# Patient Record
Sex: FEMALE | Race: ASIAN | Employment: STUDENT | URBAN - METROPOLITAN AREA
[De-identification: names, ages, dates, MRNs, and addresses within clinical notes are randomized per-mention and may not be internally consistent; named-entity substitution may affect disease eponyms.]

---
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
## 2019-03-15 ENCOUNTER — APPOINTMENT (OUTPATIENT)
Dept: LAB | Age: 19
End: 2019-03-15
Attending: FAMILY MEDICINE
Payer: COMMERCIAL

## 2019-03-15 DIAGNOSIS — D50.8 OTHER IRON DEFICIENCY ANEMIA: ICD-10-CM

## 2019-03-15 DIAGNOSIS — E04.9 GOITER: ICD-10-CM

## 2019-03-15 DIAGNOSIS — L65.9 ALOPECIA: ICD-10-CM

## 2019-03-15 LAB
DEPRECATED HBV CORE AB SER IA-ACNC: 11.2 NG/ML (ref 12–90)
IRON SATURATION: 23 % (ref 15–50)
IRON SERPL-MCNC: 93 UG/DL (ref 50–170)
T3FREE SERPL-MCNC: 2.84 PG/ML (ref 2.4–4.2)
T4 FREE SERPL-MCNC: 1 NG/DL (ref 0.9–1.6)
THYROGLOB SERPL-MCNC: 22 U/ML (ref ?–60)
THYROPEROXIDASE AB SERPL-ACNC: 33 U/ML (ref ?–60)
TOTAL IRON BINDING CAPACITY: 407 UG/DL (ref 240–450)
TRANSFERRIN SERPL-MCNC: 273 MG/DL (ref 200–360)
TSI SER-ACNC: 1.14 MIU/ML (ref 0.36–3.74)

## 2019-03-15 PROCEDURE — 86376 MICROSOMAL ANTIBODY EACH: CPT

## 2019-03-15 PROCEDURE — 36415 COLL VENOUS BLD VENIPUNCTURE: CPT

## 2019-03-15 PROCEDURE — 84481 FREE ASSAY (FT-3): CPT

## 2019-03-15 PROCEDURE — 83550 IRON BINDING TEST: CPT

## 2019-03-15 PROCEDURE — 86800 THYROGLOBULIN ANTIBODY: CPT

## 2019-03-15 PROCEDURE — 82728 ASSAY OF FERRITIN: CPT

## 2019-03-15 PROCEDURE — 84439 ASSAY OF FREE THYROXINE: CPT

## 2019-03-15 PROCEDURE — 84443 ASSAY THYROID STIM HORMONE: CPT

## 2019-03-15 PROCEDURE — 83540 ASSAY OF IRON: CPT

## 2019-05-07 ENCOUNTER — OFFICE VISIT (OUTPATIENT)
Dept: FAMILY MEDICINE CLINIC | Facility: CLINIC | Age: 19
End: 2019-05-07
Payer: COMMERCIAL

## 2019-05-07 VITALS
OXYGEN SATURATION: 99 % | DIASTOLIC BLOOD PRESSURE: 68 MMHG | HEIGHT: 64 IN | BODY MASS INDEX: 20.11 KG/M2 | TEMPERATURE: 98 F | SYSTOLIC BLOOD PRESSURE: 108 MMHG | HEART RATE: 66 BPM | WEIGHT: 117.81 LBS | RESPIRATION RATE: 16 BRPM

## 2019-05-07 DIAGNOSIS — N92.6 IRREGULAR MENSES: Primary | ICD-10-CM

## 2019-05-07 PROCEDURE — 90471 IMMUNIZATION ADMIN: CPT | Performed by: FAMILY MEDICINE

## 2019-05-07 PROCEDURE — 90651 9VHPV VACCINE 2/3 DOSE IM: CPT | Performed by: FAMILY MEDICINE

## 2019-05-07 PROCEDURE — 99213 OFFICE O/P EST LOW 20 MIN: CPT | Performed by: FAMILY MEDICINE

## 2019-05-07 NOTE — PROGRESS NOTES
HPI:   Jonny Streeter is a 22year old female who presents for irregular menses    They were very q 28 days last for 5 days - heavy and cramping better  For 3-4 months it has been very irregular   Last month menses q 10 days     Ice cravings better with the clear  EYES:PERRLA, EOMI, normal,conjunctiva pale   SKIN: norashes,no suspicious lesions  EXTREMITIES: no cyanosis, clubbing or edema  NEURO: cranial nerves are intact,motor and sensory are grossly intact    ASSESSMENT AND PLAN:   Aida Dunn is a 22 year

## 2019-06-11 ENCOUNTER — TELEPHONE (OUTPATIENT)
Dept: FAMILY MEDICINE CLINIC | Facility: CLINIC | Age: 19
End: 2019-06-11

## 2019-06-17 ENCOUNTER — NURSE ONLY (OUTPATIENT)
Dept: FAMILY MEDICINE CLINIC | Facility: CLINIC | Age: 19
End: 2019-06-17
Payer: COMMERCIAL

## 2019-06-17 PROCEDURE — 90471 IMMUNIZATION ADMIN: CPT | Performed by: FAMILY MEDICINE

## 2019-06-17 PROCEDURE — 90651 9VHPV VACCINE 2/3 DOSE IM: CPT | Performed by: FAMILY MEDICINE

## 2019-10-31 ENCOUNTER — OFFICE VISIT (OUTPATIENT)
Dept: FAMILY MEDICINE CLINIC | Facility: CLINIC | Age: 19
End: 2019-10-31
Payer: COMMERCIAL

## 2019-10-31 VITALS
WEIGHT: 118 LBS | TEMPERATURE: 97 F | DIASTOLIC BLOOD PRESSURE: 72 MMHG | OXYGEN SATURATION: 98 % | BODY MASS INDEX: 20.14 KG/M2 | SYSTOLIC BLOOD PRESSURE: 124 MMHG | HEIGHT: 64 IN | RESPIRATION RATE: 16 BRPM | HEART RATE: 73 BPM

## 2019-10-31 DIAGNOSIS — Z00.00 ANNUAL PHYSICAL EXAM: Primary | ICD-10-CM

## 2019-10-31 PROCEDURE — 99395 PREV VISIT EST AGE 18-39: CPT | Performed by: FAMILY MEDICINE

## 2019-10-31 NOTE — PROGRESS NOTES
Ag Clemons is a 20year old female who presents for a college physical.    Patient presents with complain of Patient presents with:  Physical    Pt exercising on her own  Pt denies any chest pain, SOB or syncope with exertion.   Pt denies any sexual activ chest tenderness  LUNGS: clear to auscultation, easy breathing, no cough  CARDIO: S1, S2 auscultated, RRR without murmur  GI: BSs present, no rebound/rigidity/tenderness, no organomegaly  : not examined   MUSCULOSKELETAL: CATALAN, no significant curvature of

## 2019-11-07 ENCOUNTER — TELEPHONE (OUTPATIENT)
Dept: FAMILY MEDICINE CLINIC | Facility: CLINIC | Age: 19
End: 2019-11-07

## 2019-11-07 NOTE — TELEPHONE ENCOUNTER
Fax recd from [facility] requesting us to send them most recent office note, labs and any relevant imaging to [phone]. Fax placed in LE triage folder.

## 2019-12-09 ENCOUNTER — NURSE ONLY (OUTPATIENT)
Dept: FAMILY MEDICINE CLINIC | Facility: CLINIC | Age: 19
End: 2019-12-09
Payer: COMMERCIAL

## 2019-12-09 PROCEDURE — 90651 9VHPV VACCINE 2/3 DOSE IM: CPT | Performed by: FAMILY MEDICINE

## 2019-12-09 PROCEDURE — 90471 IMMUNIZATION ADMIN: CPT | Performed by: FAMILY MEDICINE

## 2020-04-03 ENCOUNTER — VIRTUAL PHONE E/M (OUTPATIENT)
Dept: FAMILY MEDICINE CLINIC | Facility: CLINIC | Age: 20
End: 2020-04-03
Payer: COMMERCIAL

## 2020-04-03 ENCOUNTER — TELEPHONE (OUTPATIENT)
Dept: FAMILY MEDICINE CLINIC | Facility: CLINIC | Age: 20
End: 2020-04-03

## 2020-04-03 DIAGNOSIS — J11.1 INFLUENZA: Primary | ICD-10-CM

## 2020-04-03 PROCEDURE — 99212 OFFICE O/P EST SF 10 MIN: CPT | Performed by: FAMILY MEDICINE

## 2020-04-03 RX ORDER — OSELTAMIVIR PHOSPHATE 75 MG/1
75 CAPSULE ORAL 2 TIMES DAILY
Qty: 10 CAPSULE | Refills: 0 | Status: SHIPPED | OUTPATIENT
Start: 2020-04-03 | End: 2020-04-08

## 2020-06-04 ENCOUNTER — TELEPHONE (OUTPATIENT)
Dept: FAMILY MEDICINE CLINIC | Facility: CLINIC | Age: 20
End: 2020-06-04

## 2020-06-04 NOTE — TELEPHONE ENCOUNTER
Spoke with patient advised telephone visit would be appropriate, patient will send papers on my chart with titers to be reviewed, but patient denies physical needed, no official paperwork to be filled out. Will put paperwork in Dr. Yuly Rodríguez office once sent through my chart.

## 2020-06-10 ENCOUNTER — VIRTUAL PHONE E/M (OUTPATIENT)
Dept: FAMILY MEDICINE CLINIC | Facility: CLINIC | Age: 20
End: 2020-06-10
Payer: COMMERCIAL

## 2020-06-10 ENCOUNTER — TELEPHONE (OUTPATIENT)
Dept: FAMILY MEDICINE CLINIC | Facility: CLINIC | Age: 20
End: 2020-06-10

## 2020-06-10 DIAGNOSIS — Z11.59 SCREENING FOR VIRAL DISEASE: ICD-10-CM

## 2020-06-10 DIAGNOSIS — N92.6 IRREGULAR MENSES: Primary | ICD-10-CM

## 2020-06-10 PROCEDURE — 99213 OFFICE O/P EST LOW 20 MIN: CPT | Performed by: FAMILY MEDICINE

## 2020-06-10 NOTE — TELEPHONE ENCOUNTER
PT WAS HERE TODAY AND IS NOW AT [employer] TO DO HER ORDERS. THEY TELL HER THEY CAN NOT SEE ALL THE ORDERS.       FAX # [phone]

## 2020-06-10 NOTE — PROGRESS NOTES
Virtual Telephone Check-In    Andreesimone Olivarest verbally consents to a Virtual/Telephone Check-In visit on 06/10/20. Patient has been referred to the [facility] website at [email or web address] to review the yearly Consent to Treat document.     Patient understand

## 2020-06-12 ENCOUNTER — TELEPHONE (OUTPATIENT)
Dept: FAMILY MEDICINE CLINIC | Facility: CLINIC | Age: 20
End: 2020-06-12

## 2020-06-12 ENCOUNTER — PATIENT MESSAGE (OUTPATIENT)
Dept: FAMILY MEDICINE CLINIC | Facility: CLINIC | Age: 20
End: 2020-06-12

## 2020-06-15 NOTE — TELEPHONE ENCOUNTER
From: Clem oRdriguez  To: Geraldine Corbett DO  Sent: 6/12/2020 5:28 PM CDT  Subject: Other    Hello,     I got my lab tests done from [address] [place] [address] - these include by lab reports for the antibody titer tests as well as the test ordered by Dr. Nimisha Richmond (endocrin

## 2020-06-16 ENCOUNTER — NURSE ONLY (OUTPATIENT)
Dept: FAMILY MEDICINE CLINIC | Facility: CLINIC | Age: 20
End: 2020-06-16
Payer: COMMERCIAL

## 2020-06-16 PROCEDURE — 90471 IMMUNIZATION ADMIN: CPT | Performed by: FAMILY MEDICINE

## 2020-06-16 PROCEDURE — 90715 TDAP VACCINE 7 YRS/> IM: CPT | Performed by: FAMILY MEDICINE

## 2020-09-03 ENCOUNTER — OFFICE VISIT (OUTPATIENT)
Dept: INTERNAL MEDICINE CLINIC | Facility: CLINIC | Age: 20
End: 2020-09-03
Payer: COMMERCIAL

## 2020-09-03 VITALS
SYSTOLIC BLOOD PRESSURE: 102 MMHG | BODY MASS INDEX: 18.78 KG/M2 | DIASTOLIC BLOOD PRESSURE: 60 MMHG | HEART RATE: 80 BPM | TEMPERATURE: 97 F | WEIGHT: 110 LBS | RESPIRATION RATE: 16 BRPM | HEIGHT: 64 IN

## 2020-09-03 DIAGNOSIS — Z13.1 DIABETES MELLITUS SCREENING: ICD-10-CM

## 2020-09-03 DIAGNOSIS — E28.2 PCOS (POLYCYSTIC OVARIAN SYNDROME): Primary | ICD-10-CM

## 2020-09-03 DIAGNOSIS — D50.9 IRON DEFICIENCY ANEMIA, UNSPECIFIED IRON DEFICIENCY ANEMIA TYPE: ICD-10-CM

## 2020-09-03 DIAGNOSIS — F41.9 ANXIETY AND DEPRESSION: ICD-10-CM

## 2020-09-03 DIAGNOSIS — F32.A ANXIETY AND DEPRESSION: ICD-10-CM

## 2020-09-03 PROCEDURE — 3074F SYST BP LT 130 MM HG: CPT | Performed by: PHYSICIAN ASSISTANT

## 2020-09-03 PROCEDURE — 3078F DIAST BP <80 MM HG: CPT | Performed by: PHYSICIAN ASSISTANT

## 2020-09-03 PROCEDURE — 3008F BODY MASS INDEX DOCD: CPT | Performed by: PHYSICIAN ASSISTANT

## 2020-09-03 PROCEDURE — 99203 OFFICE O/P NEW LOW 30 MIN: CPT | Performed by: PHYSICIAN ASSISTANT

## 2020-09-03 RX ORDER — HYDROXYZINE HYDROCHLORIDE 25 MG/1
25 TABLET, FILM COATED ORAL EVERY 8 HOURS PRN
Qty: 8 TABLET | Refills: 0 | Status: SHIPPED | OUTPATIENT
Start: 2020-09-03 | End: 2021-07-16

## 2020-09-03 RX ORDER — METFORMIN HYDROCHLORIDE 500 MG/1
500 TABLET, EXTENDED RELEASE ORAL
COMMUNITY
Start: 2020-07-29 | End: 2021-07-16

## 2020-09-03 NOTE — PROGRESS NOTES
Patient presents with:  Weight Loss: DD Rm 6, Loss of appetite  Anxiety: Hydroxyzine 0.25MG for 2 days, began 7/2020 loss of friend, Care of sister, Stressors include Medical School      HPI:  Pt presents as a new patient to establish care with several iss Negative for color change and rash. Neurological: Negative for dizziness, syncope, weakness, numbness, tingling and headaches. Hematological: Negative for adenopathy. Does not bruise/bleed easily.    Psychiatric/Behavioral: The patient is not nervous/an range of motion. No edema and no tenderness. No effusions. Lymphadenopathy: No cervical adenopathy. Neurological: Normal reflexes. No cranial nerve deficit or sensory deficit. Normal muscle tone. Coordination normal.   Skin: Skin is warm and dry.  No ra

## 2020-09-08 ENCOUNTER — PATIENT MESSAGE (OUTPATIENT)
Dept: INTERNAL MEDICINE CLINIC | Facility: CLINIC | Age: 20
End: 2020-09-08

## 2020-09-09 LAB
% SATURATION: 32 % (CALC) (ref 16–45)
ABSOLUTE BASOPHILS: 22 CELLS/UL (ref 0–200)
ABSOLUTE EOSINOPHILS: 120 CELLS/UL (ref 15–500)
ABSOLUTE LYMPHOCYTES: 1509 CELLS/UL (ref 850–3900)
ABSOLUTE MONOCYTES: 331 CELLS/UL (ref 200–950)
ABSOLUTE NEUTROPHILS: 2318 CELLS/UL (ref 1500–7800)
ALBUMIN/GLOBULIN RATIO: 1.7 (CALC) (ref 1–2.5)
ALBUMIN: 4.7 G/DL (ref 3.6–5.1)
ALKALINE PHOSPHATASE: 56 U/L (ref 31–125)
ALT: 12 U/L (ref 6–29)
AST: 13 U/L (ref 10–30)
BASOPHILS: 0.5 %
BILIRUBIN, TOTAL: 0.6 MG/DL (ref 0.2–1.2)
BUN: 16 MG/DL (ref 7–25)
CALCIUM: 10 MG/DL (ref 8.6–10.2)
CARBON DIOXIDE: 23 MMOL/L (ref 20–32)
CHLORIDE: 105 MMOL/L (ref 98–110)
CREATININE: 0.62 MG/DL (ref 0.5–1.1)
DHEA SULFATE: 522 MCG/DL (ref 51–321)
EGFR IF AFRICN AM: 149 ML/MIN/1.73M2
EGFR IF NONAFRICN AM: 129 ML/MIN/1.73M2
EOSINOPHILS: 2.8 %
FERRITIN: 21 NG/ML (ref 16–154)
GLOBULIN: 2.7 G/DL (CALC) (ref 1.9–3.7)
GLUCOSE: 95 MG/DL (ref 65–99)
HEMATOCRIT: 36.3 % (ref 35–45)
HEMOGLOBIN: 12.5 G/DL (ref 11.7–15.5)
INSULIN: 4.3 UIU/ML
IRON BINDING CAPACITY: 345 MCG/DL (CALC) (ref 250–450)
IRON, TOTAL: 112 MCG/DL (ref 40–190)
LYMPHOCYTES: 35.1 %
MCH: 30.7 PG (ref 27–33)
MCHC: 34.4 G/DL (ref 32–36)
MCV: 89.2 FL (ref 80–100)
MONOCYTES: 7.7 %
MPV: 10.9 FL (ref 7.5–12.5)
NEUTROPHILS: 53.9 %
PLATELET COUNT: 206 THOUSAND/UL (ref 140–400)
POTASSIUM: 4 MMOL/L (ref 3.5–5.3)
PROTEIN, TOTAL: 7.4 G/DL (ref 6.1–8.1)
RDW: 12.5 % (ref 11–15)
RED BLOOD CELL COUNT: 4.07 MILLION/UL (ref 3.8–5.1)
SODIUM: 137 MMOL/L (ref 135–146)
TSH W/REFLEX TO FT4: 1.93 MIU/L
WHITE BLOOD CELL COUNT: 4.3 THOUSAND/UL (ref 3.8–10.8)

## 2020-09-09 NOTE — TELEPHONE ENCOUNTER
I sent Sertraline 50 mg PO daily to [facility] on [address] per her [employer] message. Please call pt to verify I sent to correct pharmacy. Please also verify no SI/HI.   Please ask her to call us if any SEs or go to ER if any SI/HI develops after

## 2020-09-09 NOTE — TELEPHONE ENCOUNTER
From: June Batres  To: Maria Del Carmen Mata PA-C  Sent: 9/8/2020 9:14 PM CDT  Subject: Prescription Question    Lily Cameron David Choudhary    I am getting inclined towards medicine, can you please send the prescription to [facility] at [facility]? I have schedule the appoint

## 2020-09-09 NOTE — TELEPHONE ENCOUNTER
LOV 9/3/20 with CB. Copied notes below:    A/P:     Pcos (polycystic ovarian syndrome)  (primary encounter diagnosis) - Check labs and f/u in 4 weeks. Pt has already seen Gyn and Endo.   Iron deficiency anemia, unspecified iron deficiency anemia type - Ch

## 2020-09-11 NOTE — PROGRESS NOTES
Ferritin and iron studies are normal indicating no iron def. CMP is normal.  CBC is normal.  DHEA is higher than last I compare with. Fasting insulin level is normal.  Thyroid function is normal.  I can discuss further with pt at f/u visit.

## 2020-09-16 RX ORDER — SERTRALINE HYDROCHLORIDE 25 MG/1
25 TABLET, FILM COATED ORAL DAILY
Qty: 30 TABLET | Refills: 0 | Status: SHIPPED | OUTPATIENT
Start: 2020-09-16 | End: 2020-11-12

## 2020-09-16 NOTE — TELEPHONE ENCOUNTER
CB please advise if ok to send 25 mg to pharmacy as requested by patient. Have patient cut 50 mg in half? Please advise.

## 2020-11-04 ENCOUNTER — TELEPHONE (OUTPATIENT)
Dept: INTERNAL MEDICINE CLINIC | Facility: CLINIC | Age: 20
End: 2020-11-04

## 2020-11-04 DIAGNOSIS — Z13.228 SCREENING FOR METABOLIC DISORDER: ICD-10-CM

## 2020-11-04 DIAGNOSIS — Z13.0 SCREENING FOR BLOOD DISEASE: ICD-10-CM

## 2020-11-04 DIAGNOSIS — Z13.29 SCREENING FOR THYROID DISORDER: ICD-10-CM

## 2020-11-04 DIAGNOSIS — Z13.220 SCREENING FOR LIPID DISORDERS: ICD-10-CM

## 2020-11-04 DIAGNOSIS — Z00.00 ROUTINE GENERAL MEDICAL EXAMINATION AT A HEALTH CARE FACILITY: Primary | ICD-10-CM

## 2020-11-04 NOTE — TELEPHONE ENCOUNTER
Future Appointments   Date Time Provider Porsche Noel   11/25/2020 10:30 AM Park Diamond PA-C [facility]     Annual Physical   Lab is [facility]  Pt aware to fast and to complete labs no sooner than 2 weeks prior to physical   No call back r

## 2020-11-12 ENCOUNTER — OFFICE VISIT (OUTPATIENT)
Dept: INTERNAL MEDICINE CLINIC | Facility: CLINIC | Age: 20
End: 2020-11-12
Payer: COMMERCIAL

## 2020-11-12 VITALS
HEART RATE: 78 BPM | SYSTOLIC BLOOD PRESSURE: 96 MMHG | TEMPERATURE: 98 F | DIASTOLIC BLOOD PRESSURE: 60 MMHG | BODY MASS INDEX: 19.81 KG/M2 | WEIGHT: 116 LBS | HEIGHT: 64 IN | RESPIRATION RATE: 16 BRPM

## 2020-11-12 DIAGNOSIS — F32.A ANXIETY AND DEPRESSION: ICD-10-CM

## 2020-11-12 DIAGNOSIS — Z00.00 ANNUAL PHYSICAL EXAM: Primary | ICD-10-CM

## 2020-11-12 DIAGNOSIS — F41.9 ANXIETY AND DEPRESSION: ICD-10-CM

## 2020-11-12 DIAGNOSIS — H61.21 IMPACTED CERUMEN OF RIGHT EAR: ICD-10-CM

## 2020-11-12 DIAGNOSIS — E28.2 PCOS (POLYCYSTIC OVARIAN SYNDROME): ICD-10-CM

## 2020-11-12 PROCEDURE — 3008F BODY MASS INDEX DOCD: CPT | Performed by: PHYSICIAN ASSISTANT

## 2020-11-12 PROCEDURE — 99395 PREV VISIT EST AGE 18-39: CPT | Performed by: PHYSICIAN ASSISTANT

## 2020-11-12 PROCEDURE — 3074F SYST BP LT 130 MM HG: CPT | Performed by: PHYSICIAN ASSISTANT

## 2020-11-12 PROCEDURE — 3078F DIAST BP <80 MM HG: CPT | Performed by: PHYSICIAN ASSISTANT

## 2020-11-12 NOTE — PROGRESS NOTES
Patient presents with:  Physical: DD Rm 6, Annual Physical  Medication Follow-Up: Sertraline dosing      HPI:  Pt presents for annual physical and f/u anxiety. Pt was reluctant to start Sertraline and only started a couple of weeks ago.   Admits that h Procedure Laterality Date   • APPENDECTOMY  2010     Family History   Problem Relation Age of Onset   • Hypertension Mother    • Other (hypothyroidism) Mother    • Other (Diabetes (DM II)) Maternal Grandmother      Social History    Tobacco Use      Smok Normal rate, regular rhythm and intact distal pulses. No murmur, rubs or gallops. Pulmonary/Chest: Effort normal and breath sounds normal. No respiratory distress. No wheezes, rhonchi or rales  Abdominal: Soft.  Bowel sounds are normal. Non tender, no ma Prescriptions Disp Refills   • Sertraline HCl 50 MG Oral Tab 30 tablet 0     Sig: Take 1 tablet (50 mg total) by mouth daily. Imaging & Consults:  None      Return in about 4 weeks (around 12/10/2020) for Anxiety and depression.     There are no Patie

## 2020-11-13 PROBLEM — D50.9 IRON DEFICIENCY ANEMIA: Status: RESOLVED | Noted: 2020-09-03 | Resolved: 2020-11-13

## 2020-11-20 RX ORDER — SERTRALINE HYDROCHLORIDE 25 MG/1
TABLET, FILM COATED ORAL
Qty: 30 TABLET | Refills: 0 | Status: SHIPPED | OUTPATIENT
Start: 2020-11-20 | End: 2020-12-14

## 2020-11-20 NOTE — TELEPHONE ENCOUNTER
LFV:11/12/2020 with CB  Anxiety and depression - Anxiety improving. Discussed options including STEM clinic. Pt would like to increase Sertraline to 50 mg daily.   Discussed that if making her feel more \"flat\" then we will consider alt med or refer to S

## 2020-11-20 NOTE — TELEPHONE ENCOUNTER
Please let pt know I am OK with her continuing Sertraline 25 mg daily. I would still like her to f/u in about 4 weeks for re-eval, OK to virtual if she prefers.

## 2020-12-14 ENCOUNTER — PATIENT MESSAGE (OUTPATIENT)
Dept: INTERNAL MEDICINE CLINIC | Facility: CLINIC | Age: 20
End: 2020-12-14

## 2020-12-15 NOTE — TELEPHONE ENCOUNTER
LM on pt preferred number requesting pt to call if she is having SI/HI.   Pt also advised to F/U in May/June of 2021

## 2020-12-15 NOTE — TELEPHONE ENCOUNTER
From: Roxy Ferguson  To: Shiela Boyce PA-C  Sent: 12/14/2020 3:01 PM CST  Subject: Prescription Question    Lily Sinha    I have been taking Sertraline 50mg since 22nd Nov and I see a considerable difference in my mood - all poisitive.  I am feeling

## 2020-12-15 NOTE — TELEPHONE ENCOUNTER
Just confirm with pt no SI/HI so that it is documented. I sent the refill. I will need to see her in May or June for follow up of anxiety and depression.

## 2021-04-29 NOTE — PROGRESS NOTES
Patient presents with: Follow - Up: DD Rm 6, Anxiety medication F/U   Blood Sugar: Fasting reading 108      HPI:  Pt presents for follow up of anxiety and depression.   Pt has tolerated Sertraline 50 mg daily over the last almost 6 mos with good control of problems. She will begin taper in a couple of weeks. Pcos (polycystic ovarian syndrome) - Check DHEA with labs. Plans to follow up with Gyn this summer. Hyperglycemia - In setting of PCOS. Check A1c.       Orders Placed This Encounter      HGB A1C [495

## 2021-05-03 NOTE — PROGRESS NOTES
A1c falls in normal range. DHEA is elevated, more than last check. Pt is planning to follow up with her Gyn.

## 2021-05-10 NOTE — TELEPHONE ENCOUNTER
Last VISIT 4/29/21 CB Anxiety/Depression    Last REFILL 12/15/20 Sertraline 30T 4R    Last LABS 5/1/21 Dehydroepiandrosterone, HgA1c 9/8/20 TSH, CBC. CMP, Iron TIBC, Ferritin    No future appointments. Per PROTOCOL?   Sertraline not on protocol, Route

## 2021-05-10 NOTE — TELEPHONE ENCOUNTER
I declined this refill as pt told me as last visit she would like to wean off Sertraline.   Please let her know that if something has changed I will refill but I thought this was likely an \"auto refill\" request.

## 2021-05-11 NOTE — TELEPHONE ENCOUNTER
Spoke with pt,  States she has not started to ween off of Rx. Pt states that she wants 2 months of Rx and will try to ween off when school ends and is on summer break. Pending to preferred pharmacy, please sign if appropriate.

## 2022-05-11 NOTE — PROGRESS NOTES
Virtual/Telephone Check-In    Ag Clemons verbally consents to a Virtual/Telephone Check-In service on 04/03/20. Patient understands and accepts financial responsibility for any deductible, co-insurance and/or co-pays associated with this service.     Dur surgery for wound right back

## 2022-09-12 ENCOUNTER — TELEPHONE (OUTPATIENT)
Dept: INTERNAL MEDICINE CLINIC | Facility: CLINIC | Age: 22
End: 2022-09-12

## 2022-09-12 DIAGNOSIS — Z13.228 SCREENING FOR METABOLIC DISORDER: ICD-10-CM

## 2022-09-12 DIAGNOSIS — Z13.29 SCREENING FOR THYROID DISORDER: ICD-10-CM

## 2022-09-12 DIAGNOSIS — Z13.0 SCREENING FOR BLOOD DISEASE: ICD-10-CM

## 2022-09-12 DIAGNOSIS — Z13.220 SCREENING FOR LIPID DISORDERS: ICD-10-CM

## 2022-09-12 DIAGNOSIS — Z00.00 ROUTINE GENERAL MEDICAL EXAMINATION AT A HEALTH CARE FACILITY: Primary | ICD-10-CM

## 2022-09-12 NOTE — TELEPHONE ENCOUNTER
Future Appointments   Date Time Provider Porsche Noel   10/22/2022 11:40 AM Roger Clifford MD [facility]     [facility] to [facility]-  Pt informed that labs need to be completed no sooner than 2 weeks prior to the appt.  Pt aware to fast-no call back required

## 2022-10-13 NOTE — TELEPHONE ENCOUNTER
Pt changed appt to   Future Appointments   Date Time Provider Porsche Sadie   10/20/2022  2:00 PM Ruiz Weldon PA-C [facility]     [facility] to [facility] Pt informed that labs need to be completed no sooner than 2 weeks prior to the appt.  Pt aware to fast-no call back required

## 2022-10-20 ENCOUNTER — OFFICE VISIT (OUTPATIENT)
Dept: INTERNAL MEDICINE CLINIC | Facility: CLINIC | Age: 22
End: 2022-10-20
Payer: COMMERCIAL

## 2022-10-20 ENCOUNTER — TELEPHONE (OUTPATIENT)
Dept: INTERNAL MEDICINE CLINIC | Facility: CLINIC | Age: 22
End: 2022-10-20

## 2022-10-20 VITALS
RESPIRATION RATE: 12 BRPM | SYSTOLIC BLOOD PRESSURE: 110 MMHG | OXYGEN SATURATION: 100 % | HEART RATE: 68 BPM | BODY MASS INDEX: 19.77 KG/M2 | HEIGHT: 64 IN | DIASTOLIC BLOOD PRESSURE: 62 MMHG | WEIGHT: 115.81 LBS

## 2022-10-20 DIAGNOSIS — R73.9 HYPERGLYCEMIA: ICD-10-CM

## 2022-10-20 DIAGNOSIS — Z20.2 EXPOSURE TO SYPHILIS: ICD-10-CM

## 2022-10-20 DIAGNOSIS — E28.2 PCOS (POLYCYSTIC OVARIAN SYNDROME): ICD-10-CM

## 2022-10-20 DIAGNOSIS — K62.5 BRBPR (BRIGHT RED BLOOD PER RECTUM): ICD-10-CM

## 2022-10-20 DIAGNOSIS — Z00.00 ANNUAL PHYSICAL EXAM: Primary | ICD-10-CM

## 2022-10-20 PROCEDURE — 99395 PREV VISIT EST AGE 18-39: CPT | Performed by: PHYSICIAN ASSISTANT

## 2022-10-20 PROCEDURE — 3074F SYST BP LT 130 MM HG: CPT | Performed by: PHYSICIAN ASSISTANT

## 2022-10-20 PROCEDURE — 90686 IIV4 VACC NO PRSV 0.5 ML IM: CPT | Performed by: PHYSICIAN ASSISTANT

## 2022-10-20 PROCEDURE — 3078F DIAST BP <80 MM HG: CPT | Performed by: PHYSICIAN ASSISTANT

## 2022-10-20 PROCEDURE — 3008F BODY MASS INDEX DOCD: CPT | Performed by: PHYSICIAN ASSISTANT

## 2022-10-20 PROCEDURE — 90471 IMMUNIZATION ADMIN: CPT | Performed by: PHYSICIAN ASSISTANT

## 2022-10-22 LAB
ABSOLUTE BASOPHILS: 48 CELLS/UL (ref 0–200)
ABSOLUTE EOSINOPHILS: 212 CELLS/UL (ref 15–500)
ABSOLUTE LYMPHOCYTES: 1166 CELLS/UL (ref 850–3900)
ABSOLUTE MONOCYTES: 424 CELLS/UL (ref 200–950)
ABSOLUTE NEUTROPHILS: 3450 CELLS/UL (ref 1500–7800)
ALBUMIN/GLOBULIN RATIO: 1.7 (CALC) (ref 1–2.5)
ALBUMIN: 4.8 G/DL (ref 3.6–5.1)
ALKALINE PHOSPHATASE: 60 U/L (ref 31–125)
ALT: 16 U/L (ref 6–29)
AST: 17 U/L (ref 10–30)
BASOPHILS: 0.9 %
BILIRUBIN, TOTAL: 0.4 MG/DL (ref 0.2–1.2)
BUN: 20 MG/DL (ref 7–25)
CALCIUM: 10 MG/DL (ref 8.6–10.2)
CARBON DIOXIDE: 24 MMOL/L (ref 20–32)
CHLORIDE: 104 MMOL/L (ref 98–110)
CHOL/HDLC RATIO: 2.3 (CALC)
CHOLESTEROL, TOTAL: 142 MG/DL
CREATININE: 0.65 MG/DL (ref 0.5–0.96)
EGFR: 127 ML/MIN/1.73M2
EOSINOPHILS: 4 %
GLOBULIN: 2.8 G/DL (CALC) (ref 1.9–3.7)
GLUCOSE: 89 MG/DL (ref 65–99)
HDL CHOLESTEROL: 62 MG/DL
HEMATOCRIT: 37.7 % (ref 35–45)
HEMOGLOBIN: 12.5 G/DL (ref 11.7–15.5)
LDL-CHOLESTEROL: 66 MG/DL (CALC)
LYMPHOCYTES: 22 %
MCH: 30.6 PG (ref 27–33)
MCHC: 33.2 G/DL (ref 32–36)
MCV: 92.2 FL (ref 80–100)
MONOCYTES: 8 %
MPV: 10.9 FL (ref 7.5–12.5)
NEUTROPHILS: 65.1 %
NON-HDL CHOLESTEROL: 80 MG/DL (CALC)
PLATELET COUNT: 200 THOUSAND/UL (ref 140–400)
POTASSIUM: 4.2 MMOL/L (ref 3.5–5.3)
PROTEIN, TOTAL: 7.6 G/DL (ref 6.1–8.1)
RDW: 12.2 % (ref 11–15)
RED BLOOD CELL COUNT: 4.09 MILLION/UL (ref 3.8–5.1)
SODIUM: 137 MMOL/L (ref 135–146)
TRIGLYCERIDES: 56 MG/DL
TSH W/REFLEX TO FT4: 2.23 MIU/L
WHITE BLOOD CELL COUNT: 5.3 THOUSAND/UL (ref 3.8–10.8)

## 2023-03-29 ENCOUNTER — HOSPITAL ENCOUNTER (OUTPATIENT)
Dept: GENERAL RADIOLOGY | Age: 23
Discharge: HOME | End: 2023-03-29
Attending: PHYSICIAN ASSISTANT
Payer: COMMERCIAL

## 2023-03-29 ENCOUNTER — OFFICE VISIT (OUTPATIENT)
Dept: INTERNAL MEDICINE CLINIC | Facility: CLINIC | Age: 23
End: 2023-03-29
Payer: COMMERCIAL

## 2023-03-29 ENCOUNTER — LAB ENCOUNTER (OUTPATIENT)
Dept: LAB | Age: 23
End: 2023-03-29
Attending: PHYSICIAN ASSISTANT
Payer: COMMERCIAL

## 2023-03-29 VITALS
WEIGHT: 112.19 LBS | OXYGEN SATURATION: 99 % | DIASTOLIC BLOOD PRESSURE: 58 MMHG | SYSTOLIC BLOOD PRESSURE: 106 MMHG | TEMPERATURE: 97 F | RESPIRATION RATE: 16 BRPM | HEIGHT: 64.17 IN | BODY MASS INDEX: 19.15 KG/M2 | HEART RATE: 84 BPM

## 2023-03-29 DIAGNOSIS — R73.9 HYPERGLYCEMIA: Primary | ICD-10-CM

## 2023-03-29 DIAGNOSIS — Z78.9 HISTORY OF RECENT FOREIGN TRAVEL: ICD-10-CM

## 2023-03-29 DIAGNOSIS — R63.4 WEIGHT LOSS: ICD-10-CM

## 2023-03-29 DIAGNOSIS — R19.5 CHANGE IN STOOL: ICD-10-CM

## 2023-03-29 DIAGNOSIS — K59.09 CHRONIC CONSTIPATION: ICD-10-CM

## 2023-03-29 DIAGNOSIS — R19.7 DIARRHEA, UNSPECIFIED TYPE: ICD-10-CM

## 2023-03-29 DIAGNOSIS — Z20.2 EXPOSURE TO SYPHILIS: ICD-10-CM

## 2023-03-29 DIAGNOSIS — R19.7 DIARRHEA, UNSPECIFIED TYPE: Primary | ICD-10-CM

## 2023-03-29 LAB
ALBUMIN SERPL-MCNC: 4 G/DL (ref 3.4–5)
ALBUMIN/GLOB SERPL: 1 {RATIO} (ref 1–2)
ALP LIVER SERPL-CCNC: 65 U/L
ALT SERPL-CCNC: 54 U/L
ANION GAP SERPL CALC-SCNC: 3 MMOL/L (ref 0–18)
AST SERPL-CCNC: 21 U/L (ref 15–37)
BASOPHILS # BLD AUTO: 0.02 X10(3) UL (ref 0–0.2)
BASOPHILS NFR BLD AUTO: 0.4 %
BILIRUB SERPL-MCNC: 0.3 MG/DL (ref 0.1–2)
BUN BLD-MCNC: 19 MG/DL (ref 7–18)
CALCIUM BLD-MCNC: 9.6 MG/DL (ref 8.5–10.1)
CHLORIDE SERPL-SCNC: 109 MMOL/L (ref 98–112)
CO2 SERPL-SCNC: 27 MMOL/L (ref 21–32)
CREAT BLD-MCNC: 0.49 MG/DL
EOSINOPHIL # BLD AUTO: 0.06 X10(3) UL (ref 0–0.7)
EOSINOPHIL NFR BLD AUTO: 1.1 %
ERYTHROCYTE [DISTWIDTH] IN BLOOD BY AUTOMATED COUNT: 12.1 %
FASTING STATUS PATIENT QL REPORTED: NO
GFR SERPLBLD BASED ON 1.73 SQ M-ARVRAT: 136 ML/MIN/1.73M2 (ref 60–?)
GLOBULIN PLAS-MCNC: 4 G/DL (ref 2.8–4.4)
GLUCOSE BLD-MCNC: 97 MG/DL (ref 70–99)
HCT VFR BLD AUTO: 36.1 %
HGB BLD-MCNC: 12.1 G/DL
IMM GRANULOCYTES # BLD AUTO: 0.01 X10(3) UL (ref 0–1)
IMM GRANULOCYTES NFR BLD: 0.2 %
LYMPHOCYTES # BLD AUTO: 1.98 X10(3) UL (ref 1–4)
LYMPHOCYTES NFR BLD AUTO: 36.2 %
MCH RBC QN AUTO: 28.9 PG (ref 26–34)
MCHC RBC AUTO-ENTMCNC: 33.5 G/DL (ref 31–37)
MCV RBC AUTO: 86.2 FL
MONOCYTES # BLD AUTO: 0.51 X10(3) UL (ref 0.1–1)
MONOCYTES NFR BLD AUTO: 9.3 %
NEUTROPHILS # BLD AUTO: 2.89 X10 (3) UL (ref 1.5–7.7)
NEUTROPHILS # BLD AUTO: 2.89 X10(3) UL (ref 1.5–7.7)
NEUTROPHILS NFR BLD AUTO: 52.8 %
OSMOLALITY SERPL CALC.SUM OF ELEC: 290 MOSM/KG (ref 275–295)
PLATELET # BLD AUTO: 275 10(3)UL (ref 150–450)
POTASSIUM SERPL-SCNC: 4.4 MMOL/L (ref 3.5–5.1)
PROT SERPL-MCNC: 8 G/DL (ref 6.4–8.2)
RBC # BLD AUTO: 4.19 X10(6)UL
SODIUM SERPL-SCNC: 139 MMOL/L (ref 136–145)
WBC # BLD AUTO: 5.5 X10(3) UL (ref 4–11)

## 2023-03-29 PROCEDURE — 80053 COMPREHEN METABOLIC PANEL: CPT | Performed by: PHYSICIAN ASSISTANT

## 2023-03-29 PROCEDURE — 99214 OFFICE O/P EST MOD 30 MIN: CPT | Performed by: PHYSICIAN ASSISTANT

## 2023-03-29 PROCEDURE — 36415 COLL VENOUS BLD VENIPUNCTURE: CPT | Performed by: PHYSICIAN ASSISTANT

## 2023-03-29 PROCEDURE — 74018 RADEX ABDOMEN 1 VIEW: CPT | Performed by: PHYSICIAN ASSISTANT

## 2023-03-29 PROCEDURE — 3078F DIAST BP <80 MM HG: CPT | Performed by: PHYSICIAN ASSISTANT

## 2023-03-29 PROCEDURE — 3074F SYST BP LT 130 MM HG: CPT | Performed by: PHYSICIAN ASSISTANT

## 2023-03-29 PROCEDURE — 3008F BODY MASS INDEX DOCD: CPT | Performed by: PHYSICIAN ASSISTANT

## 2023-03-29 PROCEDURE — 85025 COMPLETE CBC W/AUTO DIFF WBC: CPT | Performed by: PHYSICIAN ASSISTANT

## 2023-12-08 ENCOUNTER — TELEPHONE (OUTPATIENT)
Dept: INTERNAL MEDICINE CLINIC | Facility: CLINIC | Age: 23
End: 2023-12-08

## 2023-12-08 NOTE — TELEPHONE ENCOUNTER
CB: since patient under 25, any labs needed before appt?     Your Appointments      Wednesday December 27, 2023 10:00 AM  Physical - Established with Akbar Donald PA-C  [facility], [place] ([facility] [place]) [address] [facility]  [phone]

## 2023-12-20 LAB
ABSOLUTE BASOPHILS: 40 CELLS/UL (ref 0–200)
ABSOLUTE EOSINOPHILS: 220 CELLS/UL (ref 15–500)
ABSOLUTE LYMPHOCYTES: 1712 CELLS/UL (ref 850–3900)
ABSOLUTE MONOCYTES: 334 CELLS/UL (ref 200–950)
ABSOLUTE NEUTROPHILS: 2094 CELLS/UL (ref 1500–7800)
ALBUMIN/GLOBULIN RATIO: 1.8 (CALC) (ref 1–2.5)
ALBUMIN: 4.8 G/DL (ref 3.6–5.1)
ALKALINE PHOSPHATASE: 56 U/L (ref 31–125)
ALT: 16 U/L (ref 6–29)
AST: 16 U/L (ref 10–30)
BASOPHILS: 0.9 %
BILIRUBIN, TOTAL: 0.5 MG/DL (ref 0.2–1.2)
BUN: 16 MG/DL (ref 7–25)
CALCIUM: 10 MG/DL (ref 8.6–10.2)
CARBON DIOXIDE: 27 MMOL/L (ref 20–32)
CHLORIDE: 104 MMOL/L (ref 98–110)
CREATININE: 0.64 MG/DL (ref 0.5–0.96)
EGFR: 126 ML/MIN/1.73M2
EOSINOPHILS: 5 %
GLOBULIN: 2.7 G/DL (CALC) (ref 1.9–3.7)
GLUCOSE: 96 MG/DL (ref 65–99)
HEMATOCRIT: 37.9 % (ref 35–45)
HEMOGLOBIN A1C: 5.2 % OF TOTAL HGB
HEMOGLOBIN: 12.5 G/DL (ref 11.7–15.5)
LYMPHOCYTES: 38.9 %
MCH: 30.5 PG (ref 27–33)
MCHC: 33 G/DL (ref 32–36)
MCV: 92.4 FL (ref 80–100)
MONOCYTES: 7.6 %
MPV: 11.2 FL (ref 7.5–12.5)
NEUTROPHILS: 47.6 %
PLATELET COUNT: 225 THOUSAND/UL (ref 140–400)
POTASSIUM: 4.3 MMOL/L (ref 3.5–5.3)
PROTEIN, TOTAL: 7.5 G/DL (ref 6.1–8.1)
RDW: 11.9 % (ref 11–15)
RED BLOOD CELL COUNT: 4.1 MILLION/UL (ref 3.8–5.1)
SODIUM: 139 MMOL/L (ref 135–146)
WHITE BLOOD CELL COUNT: 4.4 THOUSAND/UL (ref 3.8–10.8)

## 2023-12-27 ENCOUNTER — OFFICE VISIT (OUTPATIENT)
Dept: INTERNAL MEDICINE CLINIC | Facility: CLINIC | Age: 23
End: 2023-12-27
Payer: COMMERCIAL

## 2023-12-27 VITALS
RESPIRATION RATE: 12 BRPM | SYSTOLIC BLOOD PRESSURE: 110 MMHG | HEART RATE: 67 BPM | OXYGEN SATURATION: 98 % | HEIGHT: 64 IN | WEIGHT: 124.63 LBS | BODY MASS INDEX: 21.28 KG/M2 | DIASTOLIC BLOOD PRESSURE: 64 MMHG

## 2023-12-27 DIAGNOSIS — M54.9 CHRONIC BILATERAL BACK PAIN, UNSPECIFIED BACK LOCATION: ICD-10-CM

## 2023-12-27 DIAGNOSIS — G89.29 CHRONIC BILATERAL BACK PAIN, UNSPECIFIED BACK LOCATION: ICD-10-CM

## 2023-12-27 DIAGNOSIS — E28.2 PCOS (POLYCYSTIC OVARIAN SYNDROME): ICD-10-CM

## 2023-12-27 DIAGNOSIS — Z00.00 ANNUAL PHYSICAL EXAM: Primary | ICD-10-CM

## 2023-12-27 PROBLEM — F32.A ANXIETY AND DEPRESSION: Status: RESOLVED | Noted: 2020-09-03 | Resolved: 2023-12-27

## 2023-12-27 PROBLEM — F41.9 ANXIETY AND DEPRESSION: Status: RESOLVED | Noted: 2020-09-03 | Resolved: 2023-12-27

## 2023-12-27 PROCEDURE — 3074F SYST BP LT 130 MM HG: CPT | Performed by: PHYSICIAN ASSISTANT

## 2023-12-27 PROCEDURE — 3078F DIAST BP <80 MM HG: CPT | Performed by: PHYSICIAN ASSISTANT

## 2023-12-27 PROCEDURE — 99395 PREV VISIT EST AGE 18-39: CPT | Performed by: PHYSICIAN ASSISTANT

## 2023-12-27 PROCEDURE — 3008F BODY MASS INDEX DOCD: CPT | Performed by: PHYSICIAN ASSISTANT

## 2024-08-08 ENCOUNTER — TELEPHONE (OUTPATIENT)
Dept: INTERNAL MEDICINE CLINIC | Facility: CLINIC | Age: 24
End: 2024-08-08

## 2024-08-08 DIAGNOSIS — M54.9 CHRONIC BILATERAL BACK PAIN, UNSPECIFIED BACK LOCATION: Primary | ICD-10-CM

## 2024-08-08 DIAGNOSIS — G89.29 CHRONIC BILATERAL BACK PAIN, UNSPECIFIED BACK LOCATION: Primary | ICD-10-CM

## 2024-08-08 NOTE — TELEPHONE ENCOUNTER
Called Tristian at [facility] to confirm fax, fax [phone].     External order pended.

## 2024-08-08 NOTE — TELEPHONE ENCOUNTER
Tristian/ with [facility] is calling to request an external referral order to their facility.   Patient currently has an order for [facility], patient would like to go to [facility]. Please fax order to number below    . [phone]  Fax. [phone]

## 2024-08-14 ENCOUNTER — MED REC SCAN ONLY (OUTPATIENT)
Dept: INTERNAL MEDICINE CLINIC | Facility: CLINIC | Age: 24
End: 2024-08-14

## 2025-01-01 NOTE — PROGRESS NOTES
Chief Complaint   Patient presents with    Physical     Rm 6 SS  Reviewed Preventative/Wellness form with patient.         HPI:  Pt presents for annual physical.  Has not had DHEA tested in over a year (last level was much improved over last).  Last A1c was normal.    Normal lipid panel in 2022.    Pt is not sexually active.  Has been vaccinated for HPV as well.  Declines pap smear today, has never had one.    Pt has a mild cough that started yesterday.  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  No fever.  Overall doing well and will follow up at her hospital for testing if needed.      Eating and exercise have been difficult during intern year.  Looking forward to a time she can pay more attention to these things.       Review of Systems   Constitutional: Negative for fever, chills and fatigue. No distress.  HENT: Negative for hearing loss, congestion, sore throat, neck pain and dental problem.    Eyes: Negative for pain and visual disturbance.   Respiratory: Negative for cough, chest tightness, shortness of breath and wheezing.    Cardiovascular: Negative for chest pain, palpitations and leg swelling.   Gastrointestinal: Negative for nausea, vomiting, abdominal pain, diarrhea, blood in stool  Genitourinary: Negative for dysuria, hematuria and difficulty urinating.   Musculoskeletal: Negative for myalgias, back pain, joint swelling, arthralgias and gait problem.   Skin: Negative for color change and rash.   Neurological: Negative for dizziness, syncope, weakness, numbness, tingling and headaches.   Hematological: Negative for adenopathy. Does not bruise/bleed easily.   Psychiatric/Behavioral: The patient is not nervous/anxious. No depression.    Patient Active Problem List   Diagnosis    PCOS (polycystic ovarian syndrome)    Hyperglycemia    Chronic constipation       History reviewed. No pertinent past medical history.  Past Surgical History:   Procedure Laterality Date     Appendectomy  2010     Family History   Problem Relation Age of Onset    Hypertension Mother     Other (hypothyroidism) Mother     Other (Diabetes (DM II)) Maternal Grandmother      Social History     Socioeconomic History    Marital status: Single   Tobacco Use    Smoking status: Never    Smokeless tobacco: Never   Vaping Use    Vaping status: Never Used   Substance and Sexual Activity    Alcohol use: Never    Drug use: Never    Sexual activity: Not Currently     Social Drivers of Health      Received from [facility], [facility]    Social Connections    Received from [facility], [facility]    Housing Stability       No current outpatient medications on file.       Allergies  Allergies[1]    Health Maintenance  Immunization History   Administered Date(s) Administered    Covid-19 Vaccine Moderna 100 mcg/0.5 ml 01/06/2021, 02/04/2021, 11/21/2021    Covid-19 Vaccine Moderna Bivalent 50mcg/0.5mL 12+ years 12/18/2022    DTAP 07/15/1999, 08/14/1999, 11/29/2000, 09/15/2003    FLULAVAL 6 months & older 0.5 ml Prefilled syringe (46499) 10/20/2022    FLUZONE 6 months and older PFS 0.5 ml (72131) 10/10/2021, 10/16/2024    Flucelvax 0.5 Ml Quad PFS Single Dose 10/15/2020    Flucelvax 0.5ml Vaccine 10/15/2020    Flucelvax Influenza vaccine, trivalent (ccIIV3), 0.5mL IM 10/15/2020, 10/06/2023    HEP A,Ped/Adol,(2 Dose) 05/29/2001, 11/30/2001    HEP B, Ped/Adol 08/14/1999, 03/01/2000    HIB 10/11/1999    Hpv Virus Vaccine 9 [facility] Im 05/07/2019, 06/17/2019, 12/09/2019    Influenza 10/15/2020    Meningococcal-Menactra 09/16/2015    OPV 06/02/1999, 08/14/1999, 11/05/2000    TDAP 04/14/2010, 06/16/2020    Typhoid,VI Polysacharide IM 01/15/2023    Varicella 09/15/2015    Yellow Fever 01/15/2023     Dental Visits:  Yes  Pap:  Never, see above      Physical Exam  /68   Pulse 68   Temp 96.6 °F (35.9 °C) (Temporal)   Resp 16   Ht 5' 4\" (1.626 m)   Wt  130 lb (59 kg)   LMP 12/12/2024 (Approximate)   SpO2 99%   Breastfeeding No   BMI 22.31 kg/m²   Constitutional: Oriented to person, place, and time. No distress.   HEENT:  Normocephalic and atraumatic. Tympanic membranes normal.  Nose normal. Oropharynx is clear and moist.   Eyes: Conjunctivae are normal. PERRLA. No scleral icterus.   Neck: Normal range of motion. Neck supple. No carotid bruits bilaterally. No edema present.  Cardiovascular: Normal rate, regular rhythm and intact distal pulses.  No murmur, rubs or gallops.  Breast:  Bilateral without masses, axillary nodes, nipple discharge and skin changes   Pulmonary/Chest: Effort normal and breath sounds normal. No respiratory distress. No wheezes, rhonchi or rales  Abdominal: Soft. Bowel sounds are normal. Non tender, no masses, no organomegaly or hernias.  Musculoskeletal: Normal range of motion. No edema and no tenderness.  No effusions.  Lymphadenopathy: No cervical adenopathy.   Neurological: Normal reflexes. No cranial nerve deficit or sensory deficit. Normal muscle tone. Coordination normal.   Skin: Skin is warm and dry. No rash noted. No erythema. No pallor.   Psychiatric: Normal mood and affect.       A/P:    Encounter Diagnoses   Name Primary?    Annual physical exam - Declines pap smear.  Vaccines through her medical program.  Pt would like to repeat lipid panel, screening fasting labs ordered.  Encouraged regular exercise and improved diet as able.   Yes    PCOS (polycystic ovarian syndrome) - check labs.       Diabetes mellitus screening     Lipid screening     Screening for blood disease     Thyroid disorder screen        Orders Placed This Encounter   Procedures    DHEA SULFATE [402] [Q]    CBC With Differential With Platelet    Comp Metabolic Panel    Lipid Panel    TSH W Reflex To Free T4    Hemoglobin A1C       Meds & Refills for this Visit:  Requested Prescriptions      No prescriptions requested or ordered in this encounter       Imaging &  Consults:  None      Return in about 1 year (around 1/2/2026) for Annual physical.    There are no Patient Instructions on file for this visit.    All questions were answered and the patient understands the plan.                  [1] No Known Allergies

## 2025-01-02 ENCOUNTER — TELEPHONE (OUTPATIENT)
Dept: INTERNAL MEDICINE CLINIC | Facility: CLINIC | Age: 25
End: 2025-01-02

## 2025-01-02 ENCOUNTER — OFFICE VISIT (OUTPATIENT)
Dept: INTERNAL MEDICINE CLINIC | Facility: CLINIC | Age: 25
End: 2025-01-02
Payer: COMMERCIAL

## 2025-01-02 VITALS
SYSTOLIC BLOOD PRESSURE: 109 MMHG | HEIGHT: 64 IN | RESPIRATION RATE: 16 BRPM | BODY MASS INDEX: 22.2 KG/M2 | WEIGHT: 130 LBS | HEART RATE: 68 BPM | DIASTOLIC BLOOD PRESSURE: 68 MMHG | TEMPERATURE: 97 F | OXYGEN SATURATION: 99 %

## 2025-01-02 DIAGNOSIS — E28.2 PCOS (POLYCYSTIC OVARIAN SYNDROME): ICD-10-CM

## 2025-01-02 DIAGNOSIS — Z13.220 LIPID SCREENING: ICD-10-CM

## 2025-01-02 DIAGNOSIS — Z13.1 DIABETES MELLITUS SCREENING: ICD-10-CM

## 2025-01-02 DIAGNOSIS — Z00.00 ANNUAL PHYSICAL EXAM: Primary | ICD-10-CM

## 2025-01-02 DIAGNOSIS — Z13.29 THYROID DISORDER SCREEN: ICD-10-CM

## 2025-01-02 DIAGNOSIS — Z13.0 SCREENING FOR BLOOD DISEASE: ICD-10-CM

## 2025-01-02 PROCEDURE — 99395 PREV VISIT EST AGE 18-39: CPT | Performed by: PHYSICIAN ASSISTANT

## 2025-01-02 NOTE — TELEPHONE ENCOUNTER
Patient called request labs prior to their annual physical.  Annual physical scheduled for 1/7/26 .   Please order labs. Patient preferred lab is [facility].  Patient informed request was sent to clinical team.  Patient informed to fast for labs.  No callback required.

## 2025-01-04 LAB
ABSOLUTE BASOPHILS: 32 CELLS/UL (ref 0–200)
ABSOLUTE EOSINOPHILS: 191 CELLS/UL (ref 15–500)
ABSOLUTE LYMPHOCYTES: 1723 CELLS/UL (ref 850–3900)
ABSOLUTE MONOCYTES: 329 CELLS/UL (ref 200–950)
ABSOLUTE NEUTROPHILS: 3026 CELLS/UL (ref 1500–7800)
ALBUMIN/GLOBULIN RATIO: 1.8 (CALC) (ref 1–2.5)
ALBUMIN: 4.9 G/DL (ref 3.6–5.1)
ALKALINE PHOSPHATASE: 65 U/L (ref 31–125)
ALT: 26 U/L (ref 6–29)
AST: 20 U/L (ref 10–30)
BASOPHILS: 0.6 %
BILIRUBIN, TOTAL: 0.5 MG/DL (ref 0.2–1.2)
BUN: 14 MG/DL (ref 7–25)
CALCIUM: 9.8 MG/DL (ref 8.6–10.2)
CARBON DIOXIDE: 25 MMOL/L (ref 20–32)
CHLORIDE: 104 MMOL/L (ref 98–110)
CHOL/HDLC RATIO: 2.5 (CALC)
CHOLESTEROL, TOTAL: 162 MG/DL
CREATININE: 0.62 MG/DL (ref 0.5–0.96)
DHEA SULFATE: 440 MCG/DL (ref 14–349)
EGFR: 127 ML/MIN/1.73M2
EOSINOPHILS: 3.6 %
GLOBULIN: 2.8 G/DL (CALC) (ref 1.9–3.7)
GLUCOSE: 93 MG/DL (ref 65–99)
HDL CHOLESTEROL: 65 MG/DL
HEMATOCRIT: 36.9 % (ref 35–45)
HEMOGLOBIN A1C: 5.6 % OF TOTAL HGB
HEMOGLOBIN: 12.1 G/DL (ref 11.7–15.5)
LDL-CHOLESTEROL: 83 MG/DL (CALC)
LYMPHOCYTES: 32.5 %
MCH: 29.1 PG (ref 27–33)
MCHC: 32.8 G/DL (ref 32–36)
MCV: 88.7 FL (ref 80–100)
MONOCYTES: 6.2 %
MPV: 11.4 FL (ref 7.5–12.5)
NEUTROPHILS: 57.1 %
NON-HDL CHOLESTEROL: 97 MG/DL (CALC)
PLATELET COUNT: 255 THOUSAND/UL (ref 140–400)
POTASSIUM: 3.8 MMOL/L (ref 3.5–5.3)
PROTEIN, TOTAL: 7.7 G/DL (ref 6.1–8.1)
RDW: 12.4 % (ref 11–15)
RED BLOOD CELL COUNT: 4.16 MILLION/UL (ref 3.8–5.1)
SODIUM: 138 MMOL/L (ref 135–146)
TRIGLYCERIDES: 55 MG/DL
TSH W/REFLEX TO FT4: 1.16 MIU/L
WHITE BLOOD CELL COUNT: 5.3 THOUSAND/UL (ref 3.8–10.8)

## 2025-05-19 ENCOUNTER — PATIENT MESSAGE (OUTPATIENT)
Dept: INTERNAL MEDICINE CLINIC | Facility: CLINIC | Age: 25
End: 2025-05-19

## (undated) NOTE — LETTER
12/04/20        Quan Jordan  [address]      Dear Anjel Neal records indicate that you have outstanding lab work and or testing that was ordered for you and has not yet been completed:  Orders Placed This Encounter

## (undated) NOTE — LETTER
10/17/18        Black Subramanian  [address]      Dear Kanchan Woods records indicate that you have outstanding lab work and or testing that was ordered for you and has not yet been completed:  Orders Placed This Encounter

## (undated) NOTE — LETTER
10/05/20        Radha Escobar  [address]      Dear Todd Jean-Baptiste records indicate that you have outstanding lab work and or testing that was ordered for you and has not yet been completed:  Orders Placed This Encounter